# Patient Record
Sex: MALE | Race: WHITE | NOT HISPANIC OR LATINO | URBAN - NONMETROPOLITAN AREA
[De-identification: names, ages, dates, MRNs, and addresses within clinical notes are randomized per-mention and may not be internally consistent; named-entity substitution may affect disease eponyms.]

---

## 2018-05-24 ENCOUNTER — OFFICE VISIT (OUTPATIENT)
Dept: UROLOGY | Facility: CLINIC | Age: 58
End: 2018-05-24

## 2018-05-24 VITALS — WEIGHT: 242 LBS | HEIGHT: 70 IN | BODY MASS INDEX: 34.65 KG/M2

## 2018-05-24 DIAGNOSIS — R97.20 ELEVATED PROSTATE SPECIFIC ANTIGEN (PSA): Primary | ICD-10-CM

## 2018-05-24 PROCEDURE — 84153 ASSAY OF PSA TOTAL: CPT | Performed by: UROLOGY

## 2018-05-24 PROCEDURE — 84154 ASSAY OF PSA FREE: CPT | Performed by: UROLOGY

## 2018-05-24 PROCEDURE — 99204 OFFICE O/P NEW MOD 45 MIN: CPT | Performed by: UROLOGY

## 2018-05-24 RX ORDER — CETIRIZINE HYDROCHLORIDE 10 MG/1
10 TABLET ORAL
Refills: 2 | COMMUNITY
Start: 2018-05-21

## 2018-05-24 RX ORDER — NAPROXEN 500 MG/1
500 TABLET ORAL 2 TIMES DAILY
Refills: 2 | COMMUNITY
Start: 2018-05-22

## 2018-05-24 RX ORDER — ALPRAZOLAM 0.5 MG/1
TABLET ORAL
COMMUNITY
Start: 2018-05-07

## 2018-05-24 RX ORDER — TAMSULOSIN HYDROCHLORIDE 0.4 MG/1
1 CAPSULE ORAL DAILY
Refills: 2 | COMMUNITY
Start: 2018-05-21

## 2018-05-24 RX ORDER — LISINOPRIL 10 MG/1
10 TABLET ORAL DAILY
Refills: 2 | COMMUNITY
Start: 2018-05-21

## 2018-05-24 RX ORDER — SIMVASTATIN 20 MG
20 TABLET ORAL DAILY
Refills: 2 | COMMUNITY
Start: 2018-05-21

## 2018-05-24 RX ORDER — LEVOTHYROXINE SODIUM 0.03 MG/1
25 TABLET ORAL DAILY
Refills: 2 | COMMUNITY
Start: 2018-05-21

## 2018-05-24 RX ORDER — MONTELUKAST SODIUM 10 MG/1
10 TABLET ORAL
Refills: 2 | COMMUNITY
Start: 2018-05-21

## 2018-05-24 RX ORDER — ASPIRIN 81 MG/1
TABLET ORAL
COMMUNITY
Start: 2018-04-20

## 2018-05-24 NOTE — PROGRESS NOTES
"Chief Complaint:          Chief Complaint   Patient presents with   • Elevated PSA       HPI:   58 y.o. male.  58-year-old white male is referred for evaluation of elevated PSA.  He was seen by Dr. Larsen.  He cannot recall his PSA.  10 years ago he states he had a biopsy with Dr. Bowden and was told he had cancer but no treatment.  He was told to go to the hospital but never did and then was he was told by Dr. Hampton it \"got healed him\".  He has no family history of prostate cancer ,his dad  of colon cancer he has no lower urinary tract symptomatology he uses a urinal at night and gets up 3 times he had sepsis a month ago but is not sure what it was from.  He was given a prescription for finasteride he didn't take.    Past Medical History:        Past Medical History:   Diagnosis Date   • Benign prostatic hyperplasia    • Elevated PSA    • Hypertension    • Stroke          Current Meds:     Current Outpatient Prescriptions   Medication Sig Dispense Refill   • ALPRAZolam (XANAX) 0.5 MG tablet      • ASPIR-LOW 81 MG EC tablet      • cetirizine (zyrTEC) 10 MG tablet Take 10 mg by mouth every night at bedtime.  2   • levothyroxine (SYNTHROID, LEVOTHROID) 25 MCG tablet Take 25 mcg by mouth Daily.  2   • lisinopril (PRINIVIL,ZESTRIL) 10 MG tablet Take 10 mg by mouth Daily. for blood pressure  2   • montelukast (SINGULAIR) 10 MG tablet Take 10 mg by mouth every night at bedtime.  2   • naproxen (NAPROSYN) 500 MG tablet Take 500 mg by mouth 2 (Two) Times a Day.  2   • simvastatin (ZOCOR) 20 MG tablet Take 20 mg by mouth Daily.  2   • tamsulosin (FLOMAX) 0.4 MG capsule 24 hr capsule Take 1 capsule by mouth Daily.  2     No current facility-administered medications for this visit.         Allergies:      Allergies   Allergen Reactions   • Codeine Hives   • Penicillins Hives        Past Surgical History:     History reviewed. No pertinent surgical history.      Social History:     Social History     Social History   • " Marital status: Unknown     Spouse name: N/A   • Number of children: N/A   • Years of education: N/A     Occupational History   • Not on file.     Social History Main Topics   • Smoking status: Former Smoker   • Smokeless tobacco: Never Used   • Alcohol use No   • Drug use: No   • Sexual activity: Not on file     Other Topics Concern   • Not on file     Social History Narrative   • No narrative on file       Family History:     Family History   Problem Relation Age of Onset   • No Known Problems Father    • No Known Problems Mother        Review of Systems:     Review of Systems   Constitutional: Negative.    HENT: Negative.    Eyes: Negative.    Respiratory: Negative.    Cardiovascular: Negative.    Gastrointestinal: Negative.    Endocrine: Negative.    Musculoskeletal: Negative.    Allergic/Immunologic: Negative.    Neurological: Negative.    Hematological: Negative.    Psychiatric/Behavioral: Negative.        Physical Exam:     Physical Exam   Constitutional: He is oriented to person, place, and time. He appears well-developed and well-nourished.   HENT:   Head: Normocephalic and atraumatic.   Eyes: Conjunctivae and EOM are normal. Pupils are equal, round, and reactive to light.   Neck: Normal range of motion.   Cardiovascular: Normal rate, regular rhythm, normal heart sounds and intact distal pulses.    Pulmonary/Chest: Effort normal and breath sounds normal.   Abdominal: Soft. Bowel sounds are normal.   Genitourinary:   Genitourinary Comments: Soft nontender abdomen with no organomegaly, rigidity, or tenderness.  He has normal external genitalia and uncircumcised phallus with a freely movable foreskin bilaterally descended testes without masses there is no inguinal hernias adenopathy or abnormalities he had good rectal tone and a large smooth firm prostate.  There is no nodularity or any suspicious rectal abnormalities     Musculoskeletal: Normal range of motion.   Neurological: He is alert and oriented to  person, place, and time. He has normal reflexes.   Skin: Skin is warm and dry.   Psychiatric: He has a normal mood and affect. His behavior is normal. Judgment and thought content normal.   Nursing note and vitals reviewed.      I have reviewed the following portions of the patient's history: allergies, current medications, past family history, past medical history, past social history, past surgical history, problem list and ROS and confirm it's accurate.      Procedure:       Assessment/Plan:   Elevated prostate specific antigen-we discussed the diagnosis of elevated prostate-specific antigen.  I explained the pathophysiology of PSA.  It is a serine protease that's function in the male reproductive tract is to facilitate the liquefaction of semen.  It is for this reason the body does not want it freely floating in the serum and why typically bound tightly to albumin.  We discussed why we used both a PSA free and total to determine the need for more aggressive therapy I discussed the normal range.  Additionally it was in the range of 1-4 but more recently has been downgraded to something less than 2 or even approaching 1.  I discussed the risk of family history particularly the fact that the average male has a 14% risk of prostate cancer and that in the face of a positive diagnosis in a father it will tablet and any other first-generation relative continued tablet insofar that a father and brother with prostate cancer will produce almost a 50% risk of prostate cancer.  I discussed the use of the temporal use of PSA as the best option for monitoring.  We also discussed the fact that an elevated PSA is an isolated event does not mean that this is prostate cancer and should not engender worry in this regard. I discussed other things that can elevate PSA including constipation, prostatitis, infection, recent intercourse etc. as well as the risks and benefits associated with this.  Also discussed the fact that this is  with a dilutional test and as a consequence of such were present produce slight variations on a single specimen.  Further discussed the risks and benefits of a prostate biopsy as well.  He was told he ask she had prostate cancer but there is no documentation of that was over 10 years ago and now he was told him the last 6 months it was a normal blood test.  Additionally, his digital rectal examination was unremarkable     Patient's Body mass index is 34.72 kg/m². BMI is above normal parameters. Recommendations include: no follow-up required.          This document has been electronically signed by SUZANNE MCGEE MD May 24, 2018 2:36 PM

## 2018-05-26 LAB
PSA FREE MFR SERPL: 24.6 %
PSA FREE SERPL-MCNC: 0.64 NG/ML
PSA SERPL-MCNC: 2.6 NG/ML (ref 0–4)

## 2018-06-21 ENCOUNTER — OFFICE VISIT (OUTPATIENT)
Dept: UROLOGY | Facility: CLINIC | Age: 58
End: 2018-06-21

## 2018-06-21 VITALS — BODY MASS INDEX: 34.65 KG/M2 | WEIGHT: 242 LBS | HEIGHT: 70 IN

## 2018-06-21 DIAGNOSIS — N40.0 BENIGN PROSTATIC HYPERPLASIA WITHOUT LOWER URINARY TRACT SYMPTOMS: Primary | ICD-10-CM

## 2018-06-21 PROCEDURE — 99213 OFFICE O/P EST LOW 20 MIN: CPT | Performed by: UROLOGY

## 2018-06-21 NOTE — PROGRESS NOTES
"Chief Complaint:          Chief Complaint   Patient presents with   • Elevated PSA       HPI:   58 y.o. male.  58-year-old white male is referred for evaluation of elevated PSA.  He was seen by Dr. Larsen.  He cannot recall his PSA.  10 years ago he states he had a biopsy with Dr. Weller and was told he had cancer but no treatment.It was a single positive core in the right side.   He was told to go to the hospital but never did and then was he was told by Dr. Hampton it \"got healed him\".  He has no family history of prostate cancer ,his dad  of colon cancer he has no lower urinary tract symptomatology he uses a urinal at night and gets up 3 times he had sepsis a month ago but is not sure what it was from.  He was given a prescription for finasteride he didn't take.  He returns today's absolutely asymptomatic his repeat PSA is 2.6 with a free PSA of 25% this is quite indicative of a lack of cancer after 10 years I gave him reassurance he does needs an annual check with his primary care physician he's very pleased with this news.       Past Medical History:        Past Medical History:   Diagnosis Date   • Benign prostatic hyperplasia    • Elevated PSA    • Hypertension    • Stroke          Current Meds:     Current Outpatient Prescriptions   Medication Sig Dispense Refill   • ALPRAZolam (XANAX) 0.5 MG tablet      • ASPIR-LOW 81 MG EC tablet      • cetirizine (zyrTEC) 10 MG tablet Take 10 mg by mouth every night at bedtime.  2   • levothyroxine (SYNTHROID, LEVOTHROID) 25 MCG tablet Take 25 mcg by mouth Daily.  2   • lisinopril (PRINIVIL,ZESTRIL) 10 MG tablet Take 10 mg by mouth Daily. for blood pressure  2   • montelukast (SINGULAIR) 10 MG tablet Take 10 mg by mouth every night at bedtime.  2   • naproxen (NAPROSYN) 500 MG tablet Take 500 mg by mouth 2 (Two) Times a Day.  2   • simvastatin (ZOCOR) 20 MG tablet Take 20 mg by mouth Daily.  2   • tamsulosin (FLOMAX) 0.4 MG capsule 24 hr capsule Take 1 capsule by " mouth Daily.  2     No current facility-administered medications for this visit.         Allergies:      Allergies   Allergen Reactions   • Codeine Hives   • Penicillins Hives        Past Surgical History:     History reviewed. No pertinent surgical history.      Social History:     Social History     Social History   • Marital status: Unknown     Spouse name: N/A   • Number of children: N/A   • Years of education: N/A     Occupational History   • Not on file.     Social History Main Topics   • Smoking status: Former Smoker   • Smokeless tobacco: Never Used   • Alcohol use No   • Drug use: No   • Sexual activity: Not on file     Other Topics Concern   • Not on file     Social History Narrative   • No narrative on file       Family History:     Family History   Problem Relation Age of Onset   • No Known Problems Father    • No Known Problems Mother        Review of Systems:     Review of Systems   Constitutional: Negative.    HENT: Negative.    Eyes: Negative.    Respiratory: Negative.    Cardiovascular: Negative.    Gastrointestinal: Negative.    Endocrine: Negative.    Musculoskeletal: Negative.    Allergic/Immunologic: Negative.    Neurological: Negative.    Hematological: Negative.    Psychiatric/Behavioral: Negative.        Physical Exam:     Physical Exam   Constitutional: He is oriented to person, place, and time. He appears well-developed and well-nourished.   HENT:   Head: Normocephalic and atraumatic.   Eyes: Conjunctivae and EOM are normal. Pupils are equal, round, and reactive to light.   Neck: Normal range of motion.   Cardiovascular: Normal rate, regular rhythm, normal heart sounds and intact distal pulses.    Pulmonary/Chest: Effort normal and breath sounds normal.   Abdominal: Soft. Bowel sounds are normal.   Musculoskeletal: Normal range of motion.   Neurological: He is alert and oriented to person, place, and time. He has normal reflexes.   Skin: Skin is warm and dry.   Psychiatric: He has a normal  mood and affect. His behavior is normal. Judgment and thought content normal.   Nursing note and vitals reviewed.      I have reviewed the following portions of the patient's history: allergies, current medications, past family history, past medical history, past social history, past surgical history, problem list and ROS and confirm it's accurate.      Procedure:       Assessment/Plan:   BPH: Discussed the pathophysiology of BPH and obstruction.  We discussed the static and dynamic effect effects of BPH as well as using 5 alpha reductase inhibitors versus alpha blockade.  We discussed the indications for transurethral surgery as well.  And/ or other therapeutic options available including all of the newer techniques.  PSA stable there is no evidence of prostate cancer     Patient's Body mass index is 34.72 kg/m². BMI is above normal parameters. Recommendations include: educational material.          This document has been electronically signed by SUZANNE MCGEE MD June 21, 2018 1:56 PM

## 2021-07-26 ENCOUNTER — HOSPITAL ENCOUNTER (EMERGENCY)
Dept: HOSPITAL 79 - ER1 | Age: 61
LOS: 1 days | Discharge: HOME | End: 2021-07-27
Payer: COMMERCIAL

## 2021-07-26 DIAGNOSIS — T82.9XXA: Primary | ICD-10-CM

## 2021-07-26 LAB
BUN/CREATININE RATIO: 16 (ref 0–10)
HGB BLD-MCNC: 15.3 GM/DL (ref 14–17.5)
RED BLOOD COUNT: 4.9 M/UL (ref 4.2–5.5)
WHITE BLOOD COUNT: 10.7 K/UL (ref 4.5–11)